# Patient Record
Sex: FEMALE | Race: ASIAN | NOT HISPANIC OR LATINO | URBAN - METROPOLITAN AREA
[De-identification: names, ages, dates, MRNs, and addresses within clinical notes are randomized per-mention and may not be internally consistent; named-entity substitution may affect disease eponyms.]

---

## 2024-01-03 ENCOUNTER — OFFICE VISIT (OUTPATIENT)
Dept: URGENT CARE | Facility: URGENT CARE | Age: 24
End: 2024-01-03
Payer: COMMERCIAL

## 2024-01-03 VITALS
HEIGHT: 65 IN | RESPIRATION RATE: 16 BRPM | TEMPERATURE: 101.9 F | DIASTOLIC BLOOD PRESSURE: 62 MMHG | BODY MASS INDEX: 19.66 KG/M2 | HEART RATE: 101 BPM | OXYGEN SATURATION: 97 % | WEIGHT: 118 LBS | SYSTOLIC BLOOD PRESSURE: 91 MMHG

## 2024-01-03 DIAGNOSIS — R30.0 DYSURIA: ICD-10-CM

## 2024-01-03 DIAGNOSIS — N76.0 ACUTE VAGINITIS: ICD-10-CM

## 2024-01-03 DIAGNOSIS — N89.8 VAGINAL DISCHARGE: ICD-10-CM

## 2024-01-03 DIAGNOSIS — N10 ACUTE PYELONEPHRITIS: Primary | ICD-10-CM

## 2024-01-03 LAB
ALBUMIN UR-MCNC: NEGATIVE MG/DL
APPEARANCE UR: CLEAR
BACTERIA #/AREA URNS HPF: ABNORMAL /HPF
BILIRUB UR QL STRIP: NEGATIVE
CLUE CELLS: ABNORMAL
COLOR UR AUTO: YELLOW
GLUCOSE UR STRIP-MCNC: NEGATIVE MG/DL
HGB UR QL STRIP: ABNORMAL
KETONES UR STRIP-MCNC: NEGATIVE MG/DL
LEUKOCYTE ESTERASE UR QL STRIP: ABNORMAL
NITRATE UR QL: NEGATIVE
PH UR STRIP: 7.5 [PH] (ref 5–7)
RBC #/AREA URNS AUTO: ABNORMAL /HPF
SP GR UR STRIP: 1.01 (ref 1–1.03)
TRICHOMONAS, WET PREP: ABNORMAL
UROBILINOGEN UR STRIP-ACNC: 1 E.U./DL
WBC #/AREA URNS AUTO: ABNORMAL /HPF
WBC'S/HIGH POWER FIELD, WET PREP: ABNORMAL
YEAST, WET PREP: ABNORMAL

## 2024-01-03 PROCEDURE — 87086 URINE CULTURE/COLONY COUNT: CPT | Performed by: PHYSICIAN ASSISTANT

## 2024-01-03 PROCEDURE — 96372 THER/PROPH/DIAG INJ SC/IM: CPT | Performed by: PHYSICIAN ASSISTANT

## 2024-01-03 PROCEDURE — 87210 SMEAR WET MOUNT SALINE/INK: CPT | Performed by: PHYSICIAN ASSISTANT

## 2024-01-03 PROCEDURE — 81001 URINALYSIS AUTO W/SCOPE: CPT | Performed by: PHYSICIAN ASSISTANT

## 2024-01-03 PROCEDURE — 99204 OFFICE O/P NEW MOD 45 MIN: CPT | Mod: 25 | Performed by: PHYSICIAN ASSISTANT

## 2024-01-03 PROCEDURE — 87186 SC STD MICRODIL/AGAR DIL: CPT | Performed by: PHYSICIAN ASSISTANT

## 2024-01-03 RX ORDER — CEFTRIAXONE SODIUM 1 G
1 VIAL (EA) INJECTION ONCE
Status: COMPLETED | OUTPATIENT
Start: 2024-01-03 | End: 2024-01-03

## 2024-01-03 RX ORDER — AZITHROMYCIN 1 G/1
1 POWDER, FOR SUSPENSION ORAL ONCE
Qty: 1 PACKET | Refills: 0 | Status: SHIPPED | OUTPATIENT
Start: 2024-01-03 | End: 2024-01-03

## 2024-01-03 RX ORDER — CIPROFLOXACIN 500 MG/1
500 TABLET, FILM COATED ORAL 2 TIMES DAILY
Qty: 14 TABLET | Refills: 0 | Status: SHIPPED | OUTPATIENT
Start: 2024-01-03 | End: 2024-01-10

## 2024-01-03 RX ADMIN — Medication 1 G: at 13:45

## 2024-01-03 NOTE — PATIENT INSTRUCTIONS
(N10) Acute pyelonephritis  (primary encounter diagnosis)  Comment: kidney infection  Plan: cefTRIAXone (ROCEPHIN) in NS for IM         administration 1 g, ciprofloxacin (CIPRO) 500         MG tablet            (R30.0) Dysuria  Comment:   Plan: UA with Microscopic reflex to Culture - Clinic         Collect, UA Microscopic with Reflex to Culture,        Urine Culture, azithromycin (ZITHROMAX) 1 g         powder            (N89.8) Vaginal discharge  Comment: sti testing pending.   Will treat presumptively for GC/Chl  Plan: Wet prep - Clinic Collect, Chlamydia         trachomatis/Neisseria gonorrhoeae by PCR -         Clinic Collect          Avoid sexual activity until symptoms have resolved, minimum one week.  If any test of STI is positive, partner must be tested and potentially treated.     (N76.0) Acute vaginitis  Comment:   Plan: azithromycin (ZITHROMAX) 1 g powder            Establish care and follow up with primary clinic or Gynecologist in 2 weeks.  To Emergency Department should symptoms worsen in any way.

## 2024-01-03 NOTE — PROGRESS NOTES
Patient presents with:  UTI: Dysuria with urination blood in urine last  week, having  Lower right abdominal pain pain radiated lower buttocks.   Vaginal Problem: Green discharge       (N10) Acute pyelonephritis  (primary encounter diagnosis)  Comment: kidney infection  Plan: cefTRIAXone (ROCEPHIN) in NS for IM         administration 1 g, ciprofloxacin (CIPRO) 500         MG tablet            (R30.0) Dysuria  Comment:   Plan: UA with Microscopic reflex to Culture - Clinic         Collect, UA Microscopic with Reflex to Culture,        Urine Culture, azithromycin (ZITHROMAX) 1 g         powder            (N89.8) Vaginal discharge  Comment: sti testing pending.   Will treat presumptively for GC/Chl  Plan: Wet prep - Clinic Collect, Chlamydia         trachomatis/Neisseria gonorrhoeae by PCR -         Clinic Collect          Avoid sexual activity until symptoms have resolved, minimum one week.  If any test of STI is positive, partner must be tested and potentially treated.     (N76.0) Acute vaginitis  Comment:   Plan: azithromycin (ZITHROMAX) 1 g powder            Establish care and follow up with primary clinic or Gynecologist in 2 weeks.  To Emergency Department should symptoms worsen in any way.    At the end of the encounter, I discussed results, diagnosis, medications. Discussed red flags for immediate return to clinic/ER, as well as indications for follow up if no improvement. Patient understood and agreed to plan. Patient was stable for discharge     If not improving or if condition worsens, follow up with your Primary Care Provider          SUBJECTIVE:   Ted Cleveland is a 23 year old female who presents today with dysuria and hematuria.  Onset of the hematuria was about a week ago, worsening, now with fevers.  No nausea or vomiting.      Last sexually active without barrier protection 12/18/23.        Current Outpatient Medications   Medication Sig Dispense Refill    Multiple Vitamins-Iron  "(DAILY-WINSTON/IRON/BETA-CAROTENE) TABS TAKE 1 TABLET BY MOUTH DAILY. (Patient not taking: Reported on 10/19/2020) 30 tablet 7     Social History     Tobacco Use    Smoking status: Never Smoker    Smokeless tobacco: Never Used   Substance Use Topics    Alcohol use: Not on file     Family History   Problem Relation Age of Onset    Diabetes Mother     Diabetes Father          ROS:    10 point ROS of systems including Constitutional, Eyes, Respiratory, Cardiovascular, Gastroenterology, Genitourinary, Integumentary, Muscularskeletal, Psychiatric ,neurological were all negative except for pertinent positives noted in my HPI       OBJECTIVE:  BP 91/62 (BP Location: Right arm, Patient Position: Chair, Cuff Size: Adult Regular)   Pulse 101   Temp (!) 101.9  F (38.8  C) (Tympanic)   Resp 16   Ht 1.651 m (5' 5\")   Wt 53.5 kg (118 lb)   LMP 12/16/2023   SpO2 97%   BMI 19.64 kg/m    Physical Exam:  GENERAL APPEARANCE: healthy, alert and no distress  EYES: EOMI,  PERRL, conjunctiva clear  HENT: ear canals and TM's normal.  Nose and mouth without ulcers, erythema or lesions  NECK: supple, nontender, no lymphadenopathy  RESP: lungs clear to auscultation - no rales, rhonchi or wheezes  CV: regular rates and rhythm, normal S1 S2, no murmur noted  ABDOMEN:  soft, nontender, no HSM or masses and bowel sounds normal  BACK: No right sided CVAT  NEURO: Normal strength and tone, sensory exam grossly normal,  normal speech and mentation  SKIN: no suspicious lesions or rashes    Results for orders placed or performed in visit on 01/03/24   UA with Microscopic reflex to Culture - Clinic Collect     Status: Abnormal    Specimen: Urine, Midstream   Result Value Ref Range    Color Urine Yellow Colorless, Straw, Light Yellow, Yellow    Appearance Urine Clear Clear    Glucose Urine Negative Negative mg/dL    Bilirubin Urine Negative Negative    Ketones Urine Negative Negative mg/dL    Specific Gravity Urine 1.015 1.003 - 1.035    Blood Urine " Small (A) Negative    pH Urine 7.5 (H) 5.0 - 7.0    Protein Albumin Urine Negative Negative mg/dL    Urobilinogen Urine 1.0 0.2, 1.0 E.U./dL    Nitrite Urine Negative Negative    Leukocyte Esterase Urine Moderate (A) Negative   UA Microscopic with Reflex to Culture     Status: Abnormal   Result Value Ref Range    Bacteria Urine Few (A) None Seen /HPF    RBC Urine 5-10 (A) 0-2 /HPF /HPF    WBC Urine 10-25 (A) 0-5 /HPF /HPF   Wet prep - Clinic Collect     Status: Abnormal    Specimen: Vagina; Swab   Result Value Ref Range    Trichomonas Absent Absent    Yeast Absent Absent    Clue Cells Absent Absent    WBCs/high power field 2+ (A) None

## 2024-01-04 LAB — BACTERIA UR CULT: ABNORMAL

## 2024-03-10 ENCOUNTER — HEALTH MAINTENANCE LETTER (OUTPATIENT)
Age: 24
End: 2024-03-10

## 2025-03-16 ENCOUNTER — HEALTH MAINTENANCE LETTER (OUTPATIENT)
Age: 25
End: 2025-03-16